# Patient Record
Sex: FEMALE | Race: BLACK OR AFRICAN AMERICAN | NOT HISPANIC OR LATINO | Employment: FULL TIME | URBAN - METROPOLITAN AREA
[De-identification: names, ages, dates, MRNs, and addresses within clinical notes are randomized per-mention and may not be internally consistent; named-entity substitution may affect disease eponyms.]

---
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
## 2025-05-27 PROBLEM — E55.9 VITAMIN D DEFICIENCY: Status: RESOLVED | Noted: 2024-07-04 | Resolved: 2025-05-27

## 2025-05-27 PROBLEM — G44.209 ACUTE NON INTRACTABLE TENSION-TYPE HEADACHE: Status: RESOLVED | Noted: 2024-07-04 | Resolved: 2025-05-27

## 2025-05-27 PROBLEM — F41.9 ANXIETY: Status: RESOLVED | Noted: 2024-07-04 | Resolved: 2025-05-27

## 2025-05-27 PROBLEM — Z97.5 NEXPLANON IN PLACE: Status: RESOLVED | Noted: 2024-07-04 | Resolved: 2025-05-27

## 2025-05-27 PROBLEM — E63.9 POOR EATING HABITS: Status: RESOLVED | Noted: 2024-07-04 | Resolved: 2025-05-27

## 2025-05-27 PROBLEM — R63.5 ABNORMAL WEIGHT GAIN: Status: RESOLVED | Noted: 2024-07-04 | Resolved: 2025-05-27

## 2025-05-27 PROBLEM — R63.1 POLYDIPSIA: Status: RESOLVED | Noted: 2024-07-04 | Resolved: 2025-05-27

## 2025-05-27 PROBLEM — H52.13 MYOPIA OF BOTH EYES: Status: RESOLVED | Noted: 2024-06-24 | Resolved: 2025-05-27

## 2025-05-27 PROBLEM — H52.203 ASTIGMATISM OF BOTH EYES: Status: RESOLVED | Noted: 2024-06-24 | Resolved: 2025-05-27

## 2025-05-27 PROBLEM — H00.14 CHALAZION OF LEFT UPPER EYELID: Status: RESOLVED | Noted: 2024-06-24 | Resolved: 2025-05-27

## 2025-05-27 NOTE — PROGRESS NOTES
1st time seeing [facility]   Nexplanon removal and other BC discussions   PMH: anxiety - taking Zoloft

## 2025-05-30 ENCOUNTER — DOCUMENTATION (OUTPATIENT)
Dept: OBSTETRICS AND GYNECOLOGY | Facility: CLINIC | Age: 20
End: 2025-05-30